# Patient Record
Sex: MALE | Race: WHITE
[De-identification: names, ages, dates, MRNs, and addresses within clinical notes are randomized per-mention and may not be internally consistent; named-entity substitution may affect disease eponyms.]

---

## 2018-05-10 ENCOUNTER — HOSPITAL ENCOUNTER (OUTPATIENT)
Dept: HOSPITAL 76 - LAB.WCP | Age: 58
End: 2018-05-10
Attending: FAMILY MEDICINE
Payer: COMMERCIAL

## 2018-05-10 DIAGNOSIS — M79.674: Primary | ICD-10-CM

## 2018-05-10 LAB
CRP SERPL-MCNC: < 1 MG/DL (ref 0–1)
ERYTHROCYTE [DISTWIDTH] IN BLOOD BY AUTOMATED COUNT: 13.7 % (ref 12–15)
HGB UR QL STRIP: 15.3 G/DL (ref 14–18)
MCH RBC QN AUTO: 31.8 PG (ref 27–31)
MCHC RBC AUTO-ENTMCNC: 34.1 G/DL (ref 32–36)
MCV RBC AUTO: 93.1 FL (ref 80–94)
NEUTROPHILS # SNV AUTO: 6.7 X10^3/UL (ref 4.8–10.8)
PDW BLD AUTO: 9.4 FL (ref 7.4–11.4)
PLATELET # BLD: 164 10^3/UL (ref 130–450)
RBC MAR: 4.81 10^6/UL (ref 4.7–6.1)
URATE SERPL-MCNC: 5.7 MG/DL (ref 2.6–7.2)

## 2018-05-10 PROCEDURE — 86140 C-REACTIVE PROTEIN: CPT

## 2018-05-10 PROCEDURE — 36415 COLL VENOUS BLD VENIPUNCTURE: CPT

## 2018-05-10 PROCEDURE — 85651 RBC SED RATE NONAUTOMATED: CPT

## 2018-05-10 PROCEDURE — 84550 ASSAY OF BLOOD/URIC ACID: CPT

## 2018-06-07 ENCOUNTER — HOSPITAL ENCOUNTER (OUTPATIENT)
Dept: HOSPITAL 76 - SDS | Age: 58
Discharge: HOME | End: 2018-06-07
Attending: SURGERY
Payer: COMMERCIAL

## 2018-06-07 VITALS — SYSTOLIC BLOOD PRESSURE: 122 MMHG | DIASTOLIC BLOOD PRESSURE: 79 MMHG

## 2018-06-07 DIAGNOSIS — D12.5: ICD-10-CM

## 2018-06-07 DIAGNOSIS — Z87.891: ICD-10-CM

## 2018-06-07 DIAGNOSIS — Z12.11: Primary | ICD-10-CM

## 2018-06-07 DIAGNOSIS — K62.1: ICD-10-CM

## 2018-06-07 DIAGNOSIS — I10: ICD-10-CM

## 2018-06-07 DIAGNOSIS — K57.30: ICD-10-CM

## 2018-06-07 PROCEDURE — 88305 TISSUE EXAM BY PATHOLOGIST: CPT

## 2018-06-07 PROCEDURE — 45385 COLONOSCOPY W/LESION REMOVAL: CPT

## 2018-06-07 PROCEDURE — 0DBP8ZX EXCISION OF RECTUM, VIA NATURAL OR ARTIFICIAL OPENING ENDOSCOPIC, DIAGNOSTIC: ICD-10-PCS | Performed by: SURGERY

## 2018-06-07 PROCEDURE — 0DBN8ZX EXCISION OF SIGMOID COLON, VIA NATURAL OR ARTIFICIAL OPENING ENDOSCOPIC, DIAGNOSTIC: ICD-10-PCS | Performed by: SURGERY

## 2018-06-07 PROCEDURE — 45380 COLONOSCOPY AND BIOPSY: CPT

## 2018-07-07 ENCOUNTER — HOSPITAL ENCOUNTER (OUTPATIENT)
Dept: HOSPITAL 76 - DI | Age: 58
Discharge: HOME | End: 2018-07-07
Attending: FAMILY MEDICINE
Payer: COMMERCIAL

## 2018-07-07 DIAGNOSIS — M67.411: ICD-10-CM

## 2018-07-07 DIAGNOSIS — M25.78: ICD-10-CM

## 2018-07-07 DIAGNOSIS — M50.123: ICD-10-CM

## 2018-07-07 DIAGNOSIS — M75.81: ICD-10-CM

## 2018-07-07 DIAGNOSIS — M19.011: ICD-10-CM

## 2018-07-07 DIAGNOSIS — M25.511: Primary | ICD-10-CM

## 2018-07-07 PROCEDURE — 72141 MRI NECK SPINE W/O DYE: CPT

## 2018-07-08 NOTE — MRI REPORT
Procedure Date:  07/07/2018   

Accession Number:  [id number] / [id number]                    

Procedure:  MRI - Shoulder RT W/O CPT Code:  

 

FULL RESULT:

 

 

EXAM:

RIGHT SHOULDER MRI WITHOUT CONTRAST

 

EXAM DATE: 7/7/2018 01:30 PM.

 

CLINICAL HISTORY: Cervical radiculopathy, shoulder joint pain, right.

 

COMPARISON: Shoulder 3 view right 06/28/2018 8:24 AM.

 

TECHNIQUE: Multiplanar, multisequence T1-weighted and fluid-sensitive 

sequences of the shoulder without contrast. Other: None.

 

FINDINGS:

Acromioclavicular Region: The acromion is type I. Mild degenerative joint 

disease. The coracoacromial and coracoclavicular ligaments are intact. No 

subacromial/subdeltoid bursal fluid.

 

Glenohumeral Region: No subluxation. No effusion or loose bodies. The 

articular cartilage is unremarkable. The glenohumeral ligaments and joint 

capsule are unremarkable.

 

Bone Marrow: No fracture, marrow edema or bone lesions.

 

Labrum: The labrum is unremarkable on this nonarthrographic study.

 

Musculature/Rotator Cuff: Mild T2 hyperintensity distal supraspinatus and 

infraspinatus tendons, tendinosis.

Incidental note is made of a 2 x 1 cm cyst within the proximal 

infraspinatus musculotendinous junction, with focal fatty atrophy of the 

posterior margin of the muscle.

Subscapularis and teres minor appear normal.

 

Biceps Tendon: The long head of the biceps tendon and biceps pulley are 

intact.

 

Other: The subcutaneous tissues are unremarkable.

IMPRESSION:

1. Focal fatty atrophy posterior margin infraspinatus muscle with a small 

intramuscular ganglion cyst, likely old injury.

2. Mild acromioclavicular degenerative joint disease.

3. Mild distal supraspinatus and infraspinatus tendinosis.

 

[facility] MUSCULOSKELETAL RADIOLOGY SECTION

## 2018-07-08 NOTE — MRI REPORT
Procedure Date:  07/07/2018   

Accession Number:  [id number] / [id number]                    

Procedure:  MRI - Cervical Spine W/O CPT Code:  

 

FULL RESULT:

 

 

EXAM:

MRI CERVICAL SPINE WITHOUT CONTRAST

 

EXAM DATE: 7/7/2018 01:30 PM.

 

CLINICAL HISTORY: CERVICAL RADICULOPATHY, SHOULDER JOINT PAIN,RIGHT.

 

COMPARISONS: 06/28/2018 cervical spine radiographs.

 

TECHNIQUE: Multiplanar, multisequence T1-weighted and fluid-sensitive 

sequences of the cervical spine without contrast. Other: None.

 

FINDINGS:

Neurologic Structures: The visualized posterior fossa structures are 

unremarkable. No signal abnormality in the visualized spinal cord.

 

Alignment: Strain of the normal cervical lordosis. No scoliosis. No 

spondylolisthesis.

 

Bone Marrow: No gross fractures or bone lesions. No marrow edema.

 

Interspace Levels/Facets:

C1-C2: Unremarkable.

 

C2-C3: Unremarkable.

 

C3-C4: Unremarkable.

 

C4-C5: Unremarkable.

 

C5-C6: Shallow broad-based posterior disk-osteophyte complex with 

prominent uncovertebral hypertrophy and mild facet hypertrophy, resulting 

in moderate bilateral foraminal stenosis and mild spinal canal stenosis. 

The canal measures 9 mm in AP diameter at this level, with effacement of 

the ventral CSF but noted the formation of the cord.

 

C6-C7: A large right foraminal disk extrusion measuring approximately 9 x 

3 mm in cross section and 9 mm in craniocaudal dimension deforms the cord 

on the right and results in circumferential effacement of the CSF. The 

canal measures approximately 7 mm in diameter at this level. There is 

also severe right foraminal stenosis.

 

C7-T1: Unremarkable.

 

Musculature: Normal. No edema or fatty atrophy.

 

Other: The paravertebral and prevertebral soft tissues are normal.

IMPRESSION:

1. Large right foraminal disk extrusion at C6-C7 indents the cord on the 

right and causes moderate spinal canal and severe right foraminal 

stenosis.

2. At C5-C6, a broad-based disk-osteophyte complex and mild facet 

arthropathy results in moderate bilateral foraminal stenosis and mild 

spinal canal stenosis.

 

[facility]

## 2019-06-21 ENCOUNTER — HOSPITAL ENCOUNTER (OUTPATIENT)
Dept: HOSPITAL 76 - LAB.WCP | Age: 59
Discharge: HOME | End: 2019-06-21
Attending: FAMILY MEDICINE
Payer: COMMERCIAL

## 2019-06-21 ENCOUNTER — HOSPITAL ENCOUNTER (OUTPATIENT)
Dept: HOSPITAL 76 - DI.WCP | Age: 59
Discharge: HOME | End: 2019-06-21
Attending: FAMILY MEDICINE
Payer: COMMERCIAL

## 2019-06-21 DIAGNOSIS — I10: ICD-10-CM

## 2019-06-21 DIAGNOSIS — I10: Primary | ICD-10-CM

## 2019-06-21 DIAGNOSIS — M54.12: Primary | ICD-10-CM

## 2019-06-21 DIAGNOSIS — Z98.1: ICD-10-CM

## 2019-06-21 LAB
ALBUMIN DIAFP-MCNC: 4.4 G/DL (ref 3.2–5.5)
ALBUMIN/GLOB SERPL: 1.4 {RATIO} (ref 1–2.2)
ALP SERPL-CCNC: 55 IU/L (ref 42–121)
ALT SERPL W P-5'-P-CCNC: 27 IU/L (ref 10–60)
ANION GAP SERPL CALCULATED.4IONS-SCNC: 10 MMOL/L (ref 6–13)
AST SERPL W P-5'-P-CCNC: 29 IU/L (ref 10–42)
BASOPHILS NFR BLD AUTO: 0 10^3/UL (ref 0–0.1)
BASOPHILS NFR BLD AUTO: 0.6 %
BILIRUB BLD-MCNC: 1 MG/DL (ref 0.2–1)
BUN SERPL-MCNC: 13 MG/DL (ref 6–20)
CALCIUM UR-MCNC: 9.5 MG/DL (ref 8.5–10.3)
CHLORIDE SERPL-SCNC: 107 MMOL/L (ref 101–111)
CHOLEST SERPL-MCNC: 155 MG/DL
CO2 SERPL-SCNC: 25 MMOL/L (ref 21–32)
CREAT SERPLBLD-SCNC: 0.8 MG/DL (ref 0.6–1.2)
EOSINOPHIL # BLD AUTO: 0.2 10^3/UL (ref 0–0.7)
EOSINOPHIL NFR BLD AUTO: 4.3 %
ERYTHROCYTE [DISTWIDTH] IN BLOOD BY AUTOMATED COUNT: 13.4 % (ref 12–15)
GFRSERPLBLD MDRD-ARVRAT: 99 ML/MIN/{1.73_M2} (ref 89–?)
GLOBULIN SER-MCNC: 3.1 G/DL (ref 2.1–4.2)
GLUCOSE SERPL-MCNC: 85 MG/DL (ref 70–100)
HDLC SERPL-MCNC: 62 MG/DL
HDLC SERPL: 2.5 {RATIO} (ref ?–5)
HGB UR QL STRIP: 16.1 G/DL (ref 14–18)
LDLC SERPL CALC-MCNC: 78 MG/DL
LDLC/HDLC SERPL: 1.3 {RATIO} (ref ?–3.6)
LYMPHOCYTES # SPEC AUTO: 2 10^3/UL (ref 1.5–3.5)
LYMPHOCYTES NFR BLD AUTO: 37.3 %
MCH RBC QN AUTO: 30.8 PG (ref 27–31)
MCHC RBC AUTO-ENTMCNC: 31.4 G/DL (ref 32–36)
MCV RBC AUTO: 98.1 FL (ref 80–94)
MONOCYTES # BLD AUTO: 0.5 10^3/UL (ref 0–1)
MONOCYTES NFR BLD AUTO: 9.6 %
NEUTROPHILS # BLD AUTO: 2.6 10^3/UL (ref 1.5–6.6)
NEUTROPHILS # SNV AUTO: 5.4 X10^3/UL (ref 4.8–10.8)
NEUTROPHILS NFR BLD AUTO: 47.8 %
PDW BLD AUTO: 11.2 FL (ref 7.4–11.4)
PLATELET # BLD: 175 10^3/UL (ref 130–450)
PROT SPEC-MCNC: 7.5 G/DL (ref 6.7–8.2)
RBC MAR: 5.22 10^6/UL (ref 4.7–6.1)
SODIUM SERPLBLD-SCNC: 142 MMOL/L (ref 135–145)
VLDLC SERPL-SCNC: 15 MG/DL

## 2019-06-21 PROCEDURE — 83721 ASSAY OF BLOOD LIPOPROTEIN: CPT

## 2019-06-21 PROCEDURE — 80053 COMPREHEN METABOLIC PANEL: CPT

## 2019-06-21 PROCEDURE — 84443 ASSAY THYROID STIM HORMONE: CPT

## 2019-06-21 PROCEDURE — 84153 ASSAY OF PSA TOTAL: CPT

## 2019-06-21 PROCEDURE — 36415 COLL VENOUS BLD VENIPUNCTURE: CPT

## 2019-06-21 PROCEDURE — 72040 X-RAY EXAM NECK SPINE 2-3 VW: CPT

## 2019-06-21 PROCEDURE — 80061 LIPID PANEL: CPT

## 2019-06-21 PROCEDURE — 85025 COMPLETE CBC W/AUTO DIFF WBC: CPT

## 2019-06-21 NOTE — XRAY REPORT
Reason:  CERVICAL RADICULOPATHY

Procedure Date:  06/21/2019   

Accession Number:  [id number] / [id number]                    

Procedure:  WCP - Cervical Spine 2 View CPT Code:  

 

FULL RESULT:

 

 

EXAM:

CERVICAL SPINE RADIOGRAPHY

 

EXAM DATE: 6/21/2019 11:00 AM.

 

CLINICAL HISTORY: Cervical radiculopathy.

 

COMPARISONS: CERVICAL SPINE 2 VIEW 06/28/2018 8:17 AM.

 

TECHNIQUE: 3 views.

 

FINDINGS:

Alignment: There has been interval ACDF of C5-C7 with plate and screw 

construct in expected configuration as well as interbody grafts without 

osseous fusion as yet. Alignment is preserved.

 

Bones: The cervical vertebral bodies and posterior elements are well 

visualized from the skull base through C7-T1. No fractures or bone 

lesions.

 

Disks: Mild spurring with preserved disk space at the C4-C5 level appears 

similar to prior.

 

Facets: Relatively mild facet arthropathy with lateral mass hypertrophy 

is also essentially unchanged.

 

Soft Tissues: Prevertebral soft tissues are not thickened compared to the 

prior examination, 1.3 cm anterior to the ACDF.

IMPRESSION: Interval ACDF with preserved alignment.

 

[facility]

## 2021-02-18 ENCOUNTER — HOSPITAL ENCOUNTER (OUTPATIENT)
Dept: HOSPITAL 76 - LAB.WCP | Age: 61
Discharge: HOME | End: 2021-02-18
Attending: INTERNAL MEDICINE
Payer: COMMERCIAL

## 2021-02-18 DIAGNOSIS — I10: Primary | ICD-10-CM

## 2021-02-18 DIAGNOSIS — Z12.5: ICD-10-CM

## 2021-02-18 LAB
ALBUMIN DIAFP-MCNC: 4.4 G/DL (ref 3.2–5.5)
ALBUMIN/GLOB SERPL: 1.4 {RATIO} (ref 1–2.2)
ALP SERPL-CCNC: 49 IU/L (ref 42–121)
ALT SERPL W P-5'-P-CCNC: 43 IU/L (ref 10–60)
ANION GAP SERPL CALCULATED.4IONS-SCNC: 10 MMOL/L (ref 6–13)
AST SERPL W P-5'-P-CCNC: 35 IU/L (ref 10–42)
BASOPHILS NFR BLD AUTO: 0 10^3/UL (ref 0–0.1)
BASOPHILS NFR BLD AUTO: 0.4 %
BILIRUB BLD-MCNC: 0.7 MG/DL (ref 0.2–1)
BUN SERPL-MCNC: 14 MG/DL (ref 6–20)
CALCIUM UR-MCNC: 9.3 MG/DL (ref 8.5–10.3)
CHLORIDE SERPL-SCNC: 105 MMOL/L (ref 101–111)
CHOLEST SERPL-MCNC: 179 MG/DL
CO2 SERPL-SCNC: 25 MMOL/L (ref 21–32)
CREAT SERPLBLD-SCNC: 0.9 MG/DL (ref 0.6–1.2)
EOSINOPHIL # BLD AUTO: 0.2 10^3/UL (ref 0–0.7)
EOSINOPHIL NFR BLD AUTO: 2.4 %
ERYTHROCYTE [DISTWIDTH] IN BLOOD BY AUTOMATED COUNT: 13.6 % (ref 12–15)
GLOBULIN SER-MCNC: 3.2 G/DL (ref 2.1–4.2)
GLUCOSE SERPL-MCNC: 80 MG/DL (ref 70–100)
HDLC SERPL-MCNC: 56 MG/DL
HDLC SERPL: 3.2 {RATIO} (ref ?–5)
HGB UR QL STRIP: 16 G/DL (ref 14–18)
LDLC SERPL CALC-MCNC: 100 MG/DL
LDLC/HDLC SERPL: 1.8 {RATIO} (ref ?–3.6)
LYMPHOCYTES # SPEC AUTO: 2.2 10^3/UL (ref 1.5–3.5)
LYMPHOCYTES NFR BLD AUTO: 30.8 %
MCH RBC QN AUTO: 31.1 PG (ref 27–31)
MCHC RBC AUTO-ENTMCNC: 32.1 G/DL (ref 32–36)
MCV RBC AUTO: 96.9 FL (ref 80–94)
MONOCYTES # BLD AUTO: 0.6 10^3/UL (ref 0–1)
MONOCYTES NFR BLD AUTO: 8.1 %
NEUTROPHILS # BLD AUTO: 4.1 10^3/UL (ref 1.5–6.6)
NEUTROPHILS # SNV AUTO: 7.1 X10^3/UL (ref 4.8–10.8)
NEUTROPHILS NFR BLD AUTO: 58 %
PDW BLD AUTO: 11.6 FL (ref 7.4–11.4)
PLATELET # BLD: 169 10^3/UL (ref 130–450)
PROT SPEC-MCNC: 7.6 G/DL (ref 6.7–8.2)
RBC MAR: 5.14 10^6/UL (ref 4.7–6.1)
VLDLC SERPL-SCNC: 23 MG/DL

## 2021-02-18 PROCEDURE — 85025 COMPLETE CBC W/AUTO DIFF WBC: CPT

## 2021-02-18 PROCEDURE — 80053 COMPREHEN METABOLIC PANEL: CPT

## 2021-02-18 PROCEDURE — 36415 COLL VENOUS BLD VENIPUNCTURE: CPT

## 2021-02-18 PROCEDURE — 84153 ASSAY OF PSA TOTAL: CPT

## 2021-02-18 PROCEDURE — 83721 ASSAY OF BLOOD LIPOPROTEIN: CPT

## 2021-02-18 PROCEDURE — 84443 ASSAY THYROID STIM HORMONE: CPT

## 2021-02-18 PROCEDURE — 80061 LIPID PANEL: CPT

## 2021-09-09 ENCOUNTER — HOSPITAL ENCOUNTER (OUTPATIENT)
Dept: HOSPITAL 76 - DI | Age: 61
Discharge: HOME | End: 2021-09-09
Attending: INTERNAL MEDICINE
Payer: COMMERCIAL

## 2021-09-09 DIAGNOSIS — I73.9: Primary | ICD-10-CM

## 2021-09-09 PROCEDURE — 93925 LOWER EXTREMITY STUDY: CPT

## 2021-09-09 NOTE — ULTRASOUND REPORT
PROCEDURE:  Duplex Lwr Ext Arterial Bilat

 

INDICATIONS:  BILATERAL CLAUDICATION

 

TECHNIQUE:  

Color and pulse Doppler interrogation was performed of both lower extremity arterial systems, with im
age documentation.  

 

COMPARISON:  None

 

FINDINGS:  

 

The right lower extremity has triphasic waveforms with no focal elevated velocity changes to suggest 
significant focal stenosis.

 

The left lower extremity has triphasic waveforms with no focal elevated velocity changes to suggest s
ignificant focal stenosis.

 

There is minimal plaque bilaterally.

 

IMPRESSION:  No evidence of significant stenosis bilaterally.

 

Reviewed by: Sonido Callaway on 9/9/2021 8:40 PM PDT

Approved by: Sonido Callaway on 9/9/2021 8:40 PM PDT

 

 

Station ID:  [id number]

## 2022-03-07 ENCOUNTER — HOSPITAL ENCOUNTER (OUTPATIENT)
Dept: HOSPITAL 76 - LAB.N | Age: 62
Discharge: HOME | End: 2022-03-07
Attending: INTERNAL MEDICINE
Payer: COMMERCIAL

## 2022-03-07 DIAGNOSIS — I10: Primary | ICD-10-CM

## 2022-03-07 DIAGNOSIS — N40.1: ICD-10-CM

## 2022-03-07 DIAGNOSIS — M10.9: ICD-10-CM

## 2022-03-07 LAB
ALBUMIN DIAFP-MCNC: 4.2 G/DL (ref 3.2–5.5)
ALBUMIN/GLOB SERPL: 1.6 {RATIO} (ref 1–2.2)
ALP SERPL-CCNC: 41 IU/L (ref 42–121)
ALT SERPL W P-5'-P-CCNC: 35 IU/L (ref 10–60)
ANION GAP SERPL CALCULATED.4IONS-SCNC: 10 MMOL/L (ref 6–13)
AST SERPL W P-5'-P-CCNC: 30 IU/L (ref 10–42)
BASOPHILS NFR BLD AUTO: 0 10^3/UL (ref 0–0.1)
BASOPHILS NFR BLD AUTO: 0.4 %
BILIRUB BLD-MCNC: 0.6 MG/DL (ref 0.2–1)
BUN SERPL-MCNC: 14 MG/DL (ref 6–20)
CALCIUM UR-MCNC: 9.2 MG/DL (ref 8.5–10.3)
CHLORIDE SERPL-SCNC: 102 MMOL/L (ref 101–111)
CHOLEST SERPL-MCNC: 158 MG/DL
CO2 SERPL-SCNC: 27 MMOL/L (ref 21–32)
CREAT SERPLBLD-SCNC: 0.9 MG/DL (ref 0.6–1.2)
EOSINOPHIL # BLD AUTO: 0.3 10^3/UL (ref 0–0.7)
EOSINOPHIL NFR BLD AUTO: 3.2 %
ERYTHROCYTE [DISTWIDTH] IN BLOOD BY AUTOMATED COUNT: 13.4 % (ref 12–15)
GFRSERPLBLD MDRD-ARVRAT: 85 ML/MIN/{1.73_M2} (ref 89–?)
GLOBULIN SER-MCNC: 2.7 G/DL (ref 2.1–4.2)
GLUCOSE SERPL-MCNC: 101 MG/DL (ref 70–100)
HCT VFR BLD AUTO: 47.3 % (ref 42–52)
HDLC SERPL-MCNC: 53 MG/DL
HDLC SERPL: 3 {RATIO} (ref ?–5)
HGB UR QL STRIP: 16.1 G/DL (ref 14–18)
LDLC SERPL CALC-MCNC: 84 MG/DL
LDLC/HDLC SERPL: 1.6 {RATIO} (ref ?–3.6)
LYMPHOCYTES # SPEC AUTO: 2.1 10^3/UL (ref 1.5–3.5)
LYMPHOCYTES NFR BLD AUTO: 27.4 %
MCH RBC QN AUTO: 32.2 PG (ref 27–31)
MCHC RBC AUTO-ENTMCNC: 34 G/DL (ref 32–36)
MCV RBC AUTO: 94.6 FL (ref 80–94)
MONOCYTES # BLD AUTO: 0.6 10^3/UL (ref 0–1)
MONOCYTES NFR BLD AUTO: 7.7 %
NEUTROPHILS # BLD AUTO: 4.7 10^3/UL (ref 1.5–6.6)
NEUTROPHILS # SNV AUTO: 7.7 X10^3/UL (ref 4.8–10.8)
NEUTROPHILS NFR BLD AUTO: 61 %
NRBC # BLD AUTO: 0 /100WBC
NRBC # BLD AUTO: 0 X10^3/UL
PDW BLD AUTO: 11.4 FL (ref 7.4–11.4)
PLATELET # BLD: 203 10^3/UL (ref 130–450)
POTASSIUM SERPL-SCNC: 3.9 MMOL/L (ref 3.5–5)
PROT SPEC-MCNC: 6.9 G/DL (ref 6.7–8.2)
RBC MAR: 5 10^6/UL (ref 4.7–6.1)
SODIUM SERPLBLD-SCNC: 139 MMOL/L (ref 135–145)
TRIGL P FAST SERPL-MCNC: 107 MG/DL
TSH SERPL-ACNC: 0.82 UIU/ML (ref 0.34–5.6)
URATE SERPL-MCNC: 6.5 MG/DL (ref 2.6–7.2)
VLDLC SERPL-SCNC: 21 MG/DL

## 2022-03-07 PROCEDURE — 84153 ASSAY OF PSA TOTAL: CPT

## 2022-03-07 PROCEDURE — 80061 LIPID PANEL: CPT

## 2022-03-07 PROCEDURE — 36415 COLL VENOUS BLD VENIPUNCTURE: CPT

## 2022-03-07 PROCEDURE — 83721 ASSAY OF BLOOD LIPOPROTEIN: CPT

## 2022-03-07 PROCEDURE — 84550 ASSAY OF BLOOD/URIC ACID: CPT

## 2022-03-07 PROCEDURE — 80053 COMPREHEN METABOLIC PANEL: CPT

## 2022-03-07 PROCEDURE — 84443 ASSAY THYROID STIM HORMONE: CPT

## 2022-03-07 PROCEDURE — 85025 COMPLETE CBC W/AUTO DIFF WBC: CPT

## 2024-09-13 ENCOUNTER — HOSPITAL ENCOUNTER (OUTPATIENT)
Dept: HOSPITAL 76 - LAB.N | Age: 64
Discharge: HOME | End: 2024-09-13
Attending: INTERNAL MEDICINE
Payer: MEDICARE

## 2024-09-13 DIAGNOSIS — I10: Primary | ICD-10-CM

## 2024-09-13 DIAGNOSIS — M10.9: ICD-10-CM

## 2024-09-13 DIAGNOSIS — N40.0: ICD-10-CM

## 2024-09-13 DIAGNOSIS — Z13.220: ICD-10-CM

## 2024-09-13 LAB
ALBUMIN DIAFP-MCNC: 4.1 G/DL (ref 3.2–5.5)
ALBUMIN/GLOB SERPL: 1.6 {RATIO} (ref 1–2.2)
ALP SERPL-CCNC: 41 IU/L (ref 42–121)
ALT SERPL W P-5'-P-CCNC: 39 IU/L (ref 10–60)
ANION GAP SERPL CALCULATED.4IONS-SCNC: 6 MMOL/L (ref 6–13)
AST SERPL W P-5'-P-CCNC: 37 IU/L (ref 10–42)
BASOPHILS NFR BLD AUTO: 0.1 10^3/UL (ref 0–0.1)
BASOPHILS NFR BLD AUTO: 0.9 %
BILIRUB BLD-MCNC: 0.7 MG/DL (ref 0.2–1)
BUN SERPL-MCNC: 15 MG/DL (ref 6–20)
CALCIUM UR-MCNC: 9.5 MG/DL (ref 8.5–10.3)
CHLORIDE SERPL-SCNC: 107 MMOL/L (ref 101–111)
CHOLEST SERPL-MCNC: 169 MG/DL
CO2 SERPL-SCNC: 27 MMOL/L (ref 21–32)
CREAT SERPLBLD-SCNC: 0.8 MG/DL (ref 0.6–1.3)
EOSINOPHIL # BLD AUTO: 0.2 10^3/UL (ref 0–0.7)
EOSINOPHIL NFR BLD AUTO: 3.6 %
ERYTHROCYTE [DISTWIDTH] IN BLOOD BY AUTOMATED COUNT: 13.5 % (ref 12–15)
GFRSERPLBLD MDRD-ARVRAT: 97 ML/MIN/{1.73_M2} (ref 89–?)
GLOBULIN SER-MCNC: 2.6 G/DL (ref 2.1–4.2)
GLUCOSE SERPL-MCNC: 96 MG/DL (ref 74–104)
HCT VFR BLD AUTO: 48.7 % (ref 42–52)
HDLC SERPL-MCNC: 50 MG/DL
HDLC SERPL: 3.4 {RATIO} (ref ?–5)
HGB UR QL STRIP: 16.2 G/DL (ref 14–18)
LDLC SERPL CALC-MCNC: 97 MG/DL
LDLC/HDLC SERPL: 1.9 {RATIO} (ref ?–3.6)
LYMPHOCYTES # SPEC AUTO: 1.8 10^3/UL (ref 1.5–3.5)
LYMPHOCYTES NFR BLD AUTO: 31.6 %
MCH RBC QN AUTO: 31.5 PG (ref 27–31)
MCHC RBC AUTO-ENTMCNC: 33.3 G/DL (ref 32–36)
MCV RBC AUTO: 94.6 FL (ref 80–94)
MONOCYTES # BLD AUTO: 0.5 10^3/UL (ref 0–1)
MONOCYTES NFR BLD AUTO: 8.2 %
NEUTROPHILS # BLD AUTO: 3.2 10^3/UL (ref 1.5–6.6)
NEUTROPHILS # SNV AUTO: 5.8 X10^3/UL (ref 4.8–10.8)
NEUTROPHILS NFR BLD AUTO: 55.5 %
NRBC # BLD AUTO: 0 /100WBC
NRBC # BLD AUTO: 0 X10^3/UL
PDW BLD AUTO: 10.7 FL (ref 7.4–11.4)
PLATELET # BLD: 181 10^3/UL (ref 130–450)
POTASSIUM SERPL-SCNC: 4.3 MMOL/L (ref 3.5–4.5)
PROT SPEC-MCNC: 6.7 G/DL (ref 6.4–8.9)
RBC MAR: 5.15 10^6/UL (ref 4.7–6.1)
SODIUM SERPLBLD-SCNC: 140 MMOL/L (ref 135–145)
TRIGL P FAST SERPL-MCNC: 111 MG/DL
URATE SERPL-MCNC: 7.4 MG/DL (ref 4.4–7.6)
VLDLC SERPL-SCNC: 22 MG/DL

## 2024-09-13 PROCEDURE — 36415 COLL VENOUS BLD VENIPUNCTURE: CPT

## 2024-09-13 PROCEDURE — 83721 ASSAY OF BLOOD LIPOPROTEIN: CPT

## 2024-09-13 PROCEDURE — 84550 ASSAY OF BLOOD/URIC ACID: CPT

## 2024-09-13 PROCEDURE — 84153 ASSAY OF PSA TOTAL: CPT

## 2024-09-13 PROCEDURE — 85025 COMPLETE CBC W/AUTO DIFF WBC: CPT

## 2024-09-13 PROCEDURE — 80061 LIPID PANEL: CPT

## 2024-09-13 PROCEDURE — 80053 COMPREHEN METABOLIC PANEL: CPT
